# Patient Record
Sex: FEMALE | Race: BLACK OR AFRICAN AMERICAN | NOT HISPANIC OR LATINO | Employment: PART TIME | URBAN - METROPOLITAN AREA
[De-identification: names, ages, dates, MRNs, and addresses within clinical notes are randomized per-mention and may not be internally consistent; named-entity substitution may affect disease eponyms.]

---

## 2020-04-02 ENCOUNTER — HISTORICAL (OUTPATIENT)
Dept: ADMINISTRATIVE | Facility: HOSPITAL | Age: 30
End: 2020-04-02

## 2020-05-04 ENCOUNTER — HISTORICAL (OUTPATIENT)
Dept: ADMINISTRATIVE | Facility: HOSPITAL | Age: 30
End: 2020-05-04

## 2020-06-17 ENCOUNTER — HISTORICAL (OUTPATIENT)
Dept: ADMINISTRATIVE | Facility: HOSPITAL | Age: 30
End: 2020-06-17

## 2020-06-30 ENCOUNTER — HISTORICAL (OUTPATIENT)
Dept: ADMINISTRATIVE | Facility: HOSPITAL | Age: 30
End: 2020-06-30

## 2020-08-13 ENCOUNTER — HISTORICAL (OUTPATIENT)
Dept: ADMINISTRATIVE | Facility: HOSPITAL | Age: 30
End: 2020-08-13

## 2020-08-31 ENCOUNTER — HISTORICAL (OUTPATIENT)
Dept: ADMINISTRATIVE | Facility: HOSPITAL | Age: 30
End: 2020-08-31

## 2020-09-03 LAB — FINAL CULTURE: NORMAL

## 2021-08-12 ENCOUNTER — HISTORICAL (OUTPATIENT)
Dept: SURGERY | Facility: HOSPITAL | Age: 31
End: 2021-08-12

## 2022-01-02 ENCOUNTER — HISTORICAL (OUTPATIENT)
Dept: LAB | Facility: HOSPITAL | Age: 32
End: 2022-01-02

## 2022-03-28 LAB
PAP RECOMMENDATION EXT: NORMAL
PAP SMEAR: NORMAL

## 2022-04-09 ENCOUNTER — HISTORICAL (OUTPATIENT)
Dept: ADMINISTRATIVE | Facility: HOSPITAL | Age: 32
End: 2022-04-09
Payer: COMMERCIAL

## 2022-04-21 ENCOUNTER — HISTORICAL (OUTPATIENT)
Dept: ADMINISTRATIVE | Facility: HOSPITAL | Age: 32
End: 2022-04-21
Payer: COMMERCIAL

## 2022-04-21 LAB
B-HCG FREE SERPL-ACNC: NORMAL [IU]/L
PROGEST SERPL-MCNC: 10.4 NG/ML

## 2022-04-25 ENCOUNTER — HISTORICAL (OUTPATIENT)
Dept: ADMINISTRATIVE | Facility: HOSPITAL | Age: 32
End: 2022-04-25
Payer: COMMERCIAL

## 2022-04-25 VITALS
HEIGHT: 67 IN | DIASTOLIC BLOOD PRESSURE: 77 MMHG | SYSTOLIC BLOOD PRESSURE: 120 MMHG | OXYGEN SATURATION: 100 % | WEIGHT: 189.63 LBS | BODY MASS INDEX: 29.76 KG/M2

## 2022-04-25 LAB
B-HCG FREE SERPL-ACNC: 3870.25 [IU]/L
PROGEST SERPL-MCNC: 9.1 NG/ML

## 2022-04-28 ENCOUNTER — HISTORICAL (OUTPATIENT)
Dept: ADMINISTRATIVE | Facility: HOSPITAL | Age: 32
End: 2022-04-28
Payer: COMMERCIAL

## 2022-04-28 LAB
B-HCG FREE SERPL-ACNC: 1985.92 [IU]/L
PROGEST SERPL-MCNC: 5.4 NG/ML

## 2022-04-30 NOTE — OP NOTE
DATE OF SURGERY:    08/12/2021    SURGEON:  Quincy Garcia MD    PREOPERATIVE DIAGNOSIS:  Unacceptable cosmetic appearance of abdomen and flanks.    POSTOPERATIVE DIAGNOSIS:  Unacceptable cosmetic appearance of abdomen and flanks.    PROCEDURE:  Liposuction to abdomen and flanks, 750 cc lipo aspirate.    INDICATIONS FOR PROCEDURE:  Claude Bullock is a 30-year-old, who is unhappy with the way her abdomen appears.  She has excessive fat and skin.  She presents for liposuction.    ANESTHESIA:  General.    COMPLICATIONS:  None.    PROCEDURE IN DETAIL:  The patient was endotracheally intubated, prepped and draped in the usual sterile fashion.  Two stab incisions were made; 1 superiorly, 1 inferior to the umbilicus, 1 L tumescent solution was instilled throughout the abdomen and flanks.  We performed mechanically-assisted liposuction throughout, removing the excess fat, 750 cc of lipo aspirate.  The stab incisions were closed using fast-absorbing suture.  Sterile dressing was applied.  No complications.  I was scrubbed and present throughout the entire procedure.        ______________________________  MD EDWAR Lamas/MATA  DD:  08/12/2021  Time:  01:06PM  DT:  08/12/2021  Time:  04:55PM  Job #:  [id number]

## 2022-05-04 ENCOUNTER — LAB VISIT (OUTPATIENT)
Dept: LAB | Facility: HOSPITAL | Age: 32
End: 2022-05-04
Attending: OBSTETRICS & GYNECOLOGY
Payer: MEDICAID

## 2022-05-04 DIAGNOSIS — O46.93 THIRD TRIMESTER BLEEDING: Primary | ICD-10-CM

## 2022-05-04 LAB — B-HCG FREE SERPL-ACNC: 165.65 MIU/ML

## 2022-05-04 PROCEDURE — 36415 COLL VENOUS BLD VENIPUNCTURE: CPT

## 2022-05-04 PROCEDURE — 81025 URINE PREGNANCY TEST: CPT

## 2022-05-12 ENCOUNTER — APPOINTMENT (OUTPATIENT)
Dept: LAB | Facility: HOSPITAL | Age: 32
End: 2022-05-12
Attending: OBSTETRICS & GYNECOLOGY
Payer: MEDICAID

## 2022-05-12 DIAGNOSIS — O46.93 THIRD TRIMESTER BLEEDING: Primary | ICD-10-CM

## 2022-05-12 LAB — B-HCG FREE SERPL-ACNC: 22.16 MIU/ML

## 2022-05-12 PROCEDURE — 36415 COLL VENOUS BLD VENIPUNCTURE: CPT

## 2022-05-12 PROCEDURE — 81025 URINE PREGNANCY TEST: CPT

## 2022-05-17 ENCOUNTER — APPOINTMENT (OUTPATIENT)
Dept: LAB | Facility: HOSPITAL | Age: 32
End: 2022-05-17
Attending: OBSTETRICS & GYNECOLOGY
Payer: MEDICAID

## 2022-05-17 DIAGNOSIS — O02.1 MISSED ABORTION: Primary | ICD-10-CM

## 2022-05-17 LAB — B-HCG FREE SERPL-ACNC: 11.09 MIU/ML

## 2022-05-17 PROCEDURE — 36415 COLL VENOUS BLD VENIPUNCTURE: CPT

## 2022-05-17 PROCEDURE — 81025 URINE PREGNANCY TEST: CPT

## 2022-05-26 ENCOUNTER — APPOINTMENT (OUTPATIENT)
Dept: LAB | Facility: HOSPITAL | Age: 32
End: 2022-05-26
Attending: OBSTETRICS & GYNECOLOGY
Payer: MEDICAID

## 2022-05-26 DIAGNOSIS — O02.1 MISSED ABORTION: Primary | ICD-10-CM

## 2022-05-26 LAB — B-HCG FREE SERPL-ACNC: 3.49 MIU/ML

## 2022-05-26 PROCEDURE — 36415 COLL VENOUS BLD VENIPUNCTURE: CPT

## 2022-05-26 PROCEDURE — 81025 URINE PREGNANCY TEST: CPT

## 2022-06-02 ENCOUNTER — OFFICE VISIT (OUTPATIENT)
Dept: FAMILY MEDICINE | Facility: CLINIC | Age: 32
End: 2022-06-02
Payer: COMMERCIAL

## 2022-06-02 VITALS
WEIGHT: 197.63 LBS | HEART RATE: 78 BPM | BODY MASS INDEX: 31.02 KG/M2 | SYSTOLIC BLOOD PRESSURE: 93 MMHG | TEMPERATURE: 98 F | RESPIRATION RATE: 18 BRPM | OXYGEN SATURATION: 98 % | HEIGHT: 67 IN | DIASTOLIC BLOOD PRESSURE: 63 MMHG

## 2022-06-02 DIAGNOSIS — Z01.818 PRE-OP EVALUATION: ICD-10-CM

## 2022-06-02 PROBLEM — J30.9 ALLERGIC RHINITIS: Status: ACTIVE | Noted: 2022-01-26

## 2022-06-02 PROBLEM — L91.0 KELOID SCAR: Status: ACTIVE | Noted: 2022-06-02

## 2022-06-02 LAB
ALBUMIN SERPL-MCNC: 4.1 GM/DL (ref 3.5–5)
ALBUMIN/GLOB SERPL: 1.1 RATIO (ref 1.1–2)
ALP SERPL-CCNC: 62 UNIT/L (ref 40–150)
ALT SERPL-CCNC: 12 UNIT/L (ref 0–55)
APTT PPP: 32.6 SECONDS
AST SERPL-CCNC: 17 UNIT/L (ref 5–34)
BASOPHILS # BLD AUTO: 0.04 X10(3)/MCL (ref 0–0.2)
BASOPHILS NFR BLD AUTO: 1.1 %
BILIRUBIN DIRECT+TOT PNL SERPL-MCNC: 0.4 MG/DL
BUN SERPL-MCNC: 12.5 MG/DL (ref 7–18.7)
CALCIUM SERPL-MCNC: 9.7 MG/DL (ref 8.4–10.2)
CHLORIDE SERPL-SCNC: 103 MMOL/L (ref 98–107)
CO2 SERPL-SCNC: 27 MMOL/L (ref 22–29)
CREAT SERPL-MCNC: 0.71 MG/DL (ref 0.55–1.02)
EOSINOPHIL # BLD AUTO: 0.09 X10(3)/MCL (ref 0–0.9)
EOSINOPHIL NFR BLD AUTO: 2.4 %
ERYTHROCYTE [DISTWIDTH] IN BLOOD BY AUTOMATED COUNT: 12.4 % (ref 11.5–17)
GLOBULIN SER-MCNC: 3.9 GM/DL (ref 2.4–3.5)
GLUCOSE SERPL-MCNC: 79 MG/DL (ref 74–100)
HCT VFR BLD AUTO: 40 % (ref 37–47)
HGB BLD-MCNC: 12.9 GM/DL (ref 12–16)
IMM GRANULOCYTES # BLD AUTO: 0 X10(3)/MCL (ref 0–0.02)
IMM GRANULOCYTES NFR BLD AUTO: 0 % (ref 0–0.43)
LYMPHOCYTES # BLD AUTO: 1.27 X10(3)/MCL (ref 0.6–4.6)
LYMPHOCYTES NFR BLD AUTO: 34.2 %
MCH RBC QN AUTO: 29.3 PG (ref 27–31)
MCHC RBC AUTO-ENTMCNC: 32.3 MG/DL (ref 33–36)
MCV RBC AUTO: 90.9 FL (ref 80–94)
MONOCYTES # BLD AUTO: 0.34 X10(3)/MCL (ref 0.1–1.3)
MONOCYTES NFR BLD AUTO: 9.2 %
NEUTROPHILS # BLD AUTO: 2 X10(3)/MCL (ref 2.1–9.2)
NEUTROPHILS NFR BLD AUTO: 53.1 %
NRBC BLD AUTO-RTO: 0 %
PLATELET # BLD AUTO: 264 X10(3)/MCL (ref 130–400)
PMV BLD AUTO: 10.2 FL (ref 9.4–12.4)
POTASSIUM SERPL-SCNC: 4.1 MMOL/L (ref 3.5–5.1)
PROT SERPL-MCNC: 8 GM/DL (ref 6.4–8.3)
RBC # BLD AUTO: 4.4 X10(6)/MCL (ref 4.2–5.4)
SODIUM SERPL-SCNC: 137 MMOL/L (ref 136–145)
WBC # SPEC AUTO: 3.7 X10(3)/MCL (ref 4.5–11.5)

## 2022-06-02 PROCEDURE — 85730 THROMBOPLASTIN TIME PARTIAL: CPT | Performed by: FAMILY MEDICINE

## 2022-06-02 PROCEDURE — 36415 COLL VENOUS BLD VENIPUNCTURE: CPT | Performed by: FAMILY MEDICINE

## 2022-06-02 PROCEDURE — 99214 OFFICE O/P EST MOD 30 MIN: CPT | Mod: PBBFAC | Performed by: FAMILY MEDICINE

## 2022-06-02 PROCEDURE — 80053 COMPREHEN METABOLIC PANEL: CPT | Performed by: FAMILY MEDICINE

## 2022-06-02 PROCEDURE — 85610 PROTHROMBIN TIME: CPT | Performed by: FAMILY MEDICINE

## 2022-06-02 PROCEDURE — 85025 COMPLETE CBC W/AUTO DIFF WBC: CPT | Performed by: FAMILY MEDICINE

## 2022-06-02 RX ORDER — LINACLOTIDE 145 UG/1
145 CAPSULE, GELATIN COATED ORAL DAILY
COMMUNITY
Start: 2022-06-01

## 2022-06-02 RX ORDER — FLUTICASONE PROPIONATE 50 MCG
SPRAY, SUSPENSION (ML) NASAL
COMMUNITY
Start: 2022-01-26

## 2022-06-02 RX ORDER — LORATADINE 10 MG/1
TABLET ORAL
COMMUNITY
Start: 2022-01-26

## 2022-06-02 RX ORDER — NORETHINDRONE ACETATE AND ETHINYL ESTRADIOL 1MG-20(24)
1 KIT ORAL DAILY
COMMUNITY
Start: 2022-03-28 | End: 2023-05-04

## 2022-06-02 NOTE — PROGRESS NOTES
Subjective:       Patient ID: Claude Bullock is a 31 y.o. female.    Chief Complaint: Pre-op Exam    HPI     30 yo here for pre op eval prior to breast lift. Pt reports that she is in her usual state of health with no concerns. She denies any SOB or CP with exertion. She is able to walk city blocks and flights of stairs without any problems. She has not had any problems with anesthesia in the past.     PMH: none  PSH: keloid excision, liposcution  FH: brother- DM I, grandfather- DM, grandmother- hypothyroid  SH: denies alcohol, tobacco, or drugs  GYN: ; taking OCP; regular mensesAllergies: NKDA  Meds: OCP    Pap NIL 2022      Review of Systems   Constitutional: Negative for activity change and unexpected weight change.   HENT: Negative for hearing loss, rhinorrhea and trouble swallowing.    Eyes: Negative for discharge and visual disturbance.   Respiratory: Negative for chest tightness and wheezing.    Cardiovascular: Negative for chest pain and palpitations.   Gastrointestinal: Negative for blood in stool, constipation, diarrhea and vomiting.   Endocrine: Negative for polydipsia and polyuria.   Genitourinary: Negative for difficulty urinating, dysuria, hematuria and menstrual problem.   Musculoskeletal: Negative for arthralgias, joint swelling and neck pain.   Neurological: Negative for weakness and headaches.   Psychiatric/Behavioral: Negative for confusion and dysphoric mood.            Objective:      Vitals:    22 0843   BP: 93/63   Pulse: 78   Resp: 18   Temp: 98.4 °F (36.9 °C)       Physical Exam      General: pleasant, well developed, in no acute distress  Head: normocephalic, atraumatic  Skin: warm and dry  Heart: regular rate and rhythm, S1S2 normal, no murmurs, rubs, or gallops  Lungs: clear to auscultation bilaterally, no wheezes  Abdomen: bowel sounds present, soft, nontender  Extremities: no edema  Neurological: nonfocal, alert and oriented, cooperative with exam  Psych: judgement and  insight good, though process logical, goal directed        Assessment/Plan:  Pre-op evaluation  -     CBC Auto Differential; Future; Expected date: 06/02/2022  -     Protime-INR; Future; Expected date: 06/02/2022  -     APTT; Future; Expected date: 06/02/2022  -     Comprehensive Metabolic Panel; Future; Expected date: 06/02/2022    - will review labs and then fax documentation     Follow up in about 1 year (around 6/2/2023).

## 2022-10-13 ENCOUNTER — DOCUMENTATION ONLY (OUTPATIENT)
Dept: FAMILY MEDICINE | Facility: CLINIC | Age: 32
End: 2022-10-13
Payer: COMMERCIAL

## 2023-05-04 ENCOUNTER — OFFICE VISIT (OUTPATIENT)
Dept: FAMILY MEDICINE | Facility: CLINIC | Age: 33
End: 2023-05-04
Payer: COMMERCIAL

## 2023-05-04 VITALS
HEIGHT: 67 IN | WEIGHT: 206.81 LBS | HEART RATE: 78 BPM | BODY MASS INDEX: 32.46 KG/M2 | RESPIRATION RATE: 18 BRPM | OXYGEN SATURATION: 99 % | SYSTOLIC BLOOD PRESSURE: 95 MMHG | TEMPERATURE: 98 F | DIASTOLIC BLOOD PRESSURE: 63 MMHG

## 2023-05-04 DIAGNOSIS — Z02.0 SCHOOL PHYSICAL EXAM: Primary | ICD-10-CM

## 2023-05-04 LAB
HBV SURFACE AB SER-ACNC: 0.31 MIU/ML
HBV SURFACE AB SERPL IA-ACNC: NONREACTIVE M[IU]/ML

## 2023-05-04 PROCEDURE — 86787 VARICELLA-ZOSTER ANTIBODY: CPT | Mod: 90 | Performed by: FAMILY MEDICINE

## 2023-05-04 PROCEDURE — 99214 OFFICE O/P EST MOD 30 MIN: CPT | Mod: PBBFAC | Performed by: FAMILY MEDICINE

## 2023-05-04 PROCEDURE — 86706 HEP B SURFACE ANTIBODY: CPT | Performed by: FAMILY MEDICINE

## 2023-05-04 PROCEDURE — 86762 RUBELLA ANTIBODY: CPT | Mod: 90 | Performed by: FAMILY MEDICINE

## 2023-05-04 PROCEDURE — 86765 RUBEOLA ANTIBODY: CPT | Mod: 90 | Performed by: FAMILY MEDICINE

## 2023-05-04 PROCEDURE — 86735 MUMPS ANTIBODY: CPT | Mod: 90 | Performed by: FAMILY MEDICINE

## 2023-05-04 PROCEDURE — 36415 COLL VENOUS BLD VENIPUNCTURE: CPT | Performed by: FAMILY MEDICINE

## 2023-05-04 PROCEDURE — 86480 TB TEST CELL IMMUN MEASURE: CPT | Mod: 90 | Performed by: FAMILY MEDICINE

## 2023-05-04 NOTE — PROGRESS NOTES
Subjective:       Patient ID: Claude Bullock is a 32 y.o. female.    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     Stopped OCP due to long menstrual cycles with all options that she has tried (nuva ring, pills, patch). Does not want to try depo. Not interested in IUD or nexplanon currently due to wanting another child in the near future.      Pap NIL March 2022    Review of Systems  As per HPI         Objective:      Vitals:    05/04/23 1121   BP: 95/63   Pulse: 78   Resp: 18   Temp: 98.4 °F (36.9 °C)       Physical Exam    General: pleasant, well developed, in no acute distress  Head: normocephalic, atraumatic  Skin: warm and dry  Heart: regular rate and rhythm, S1S2 normal, no murmurs, rubs, or gallops  Lungs: clear to auscultation bilaterally, no wheezes  Abdomen: bowel sounds present, soft, nontender  Extremities: no edema  Neurological: nonfocal, alert and oriented, cooperative with exam  Psych: judgement and insight good, though process logical, goal directed    Assessment/Plan:  School physical exam  -     Rubella antibody, IgG; Future; Expected date: 05/04/2023  -     Rubeola antibody IgG; Future; Expected date: 05/04/2023  -     Hepatitis B Surface Antibody, Qual/Quant; Future; Expected date: 05/04/2023  -     Varicella zoster antibody, IgG; Future; Expected date: 05/04/2023  -     Quantiferon Gold TB; Future; Expected date: 05/04/2023  -     Miscellaneous Test, Sendout Mumps Antibody IGg; Future; Expected date: 05/04/2023  -     [place] GENERIC ORDERABLE Mumps Antibody IGg; Future; Expected date: 05/04/2023  -     Hepatitis B Surface Ab, Qualitative; Future; Expected date: 05/04/2023    - labs ordered  - forms completed     Follow up in about 1 year (around 5/4/2024).

## 2023-05-05 DIAGNOSIS — Z23 NEED FOR VIRAL IMMUNIZATION: Primary | ICD-10-CM

## 2023-05-05 LAB
MEV IGG SER IA-ACNC: 4
MEV IGG SER QL IA: POSITIVE
MUV IGG SER IA-ACNC: 1.4
MUV IGG SER QL IA: POSITIVE
RUBV IGG SERPL IA-ACNC: 1.4
RUBV IGG SERPL QL IA: POSITIVE
VZV IGG SER IA-ACNC: 3.1
VZV IGG SER QL IA: POSITIVE

## 2023-05-07 LAB
GAMMA INTERFERON BACKGROUND BLD IA-ACNC: 0.01 IU/ML
M TB IFN-G BLD-IMP: NEGATIVE
M TB IFN-G CD4+ BCKGRND COR BLD-ACNC: 0.01 IU/ML
M TB IFN-G CD4+CD8+ BCKGRND COR BLD-ACNC: 0.04 IU/ML
MITOGEN IGNF BCKGRD COR BLD-ACNC: 9.99 IU/ML

## 2023-05-09 ENCOUNTER — CLINICAL SUPPORT (OUTPATIENT)
Dept: FAMILY MEDICINE | Facility: CLINIC | Age: 33
End: 2023-05-09
Payer: COMMERCIAL

## 2023-05-09 DIAGNOSIS — Z02.0 SCHOOL PHYSICAL EXAM: Primary | ICD-10-CM

## 2023-05-09 DIAGNOSIS — Z23 VACCINE FOR VIRAL HEPATITIS: ICD-10-CM

## 2023-05-09 PROCEDURE — 90739 HEPB VACC 2/4 DOSE ADULT IM: CPT | Mod: PBBFAC

## 2023-05-09 PROCEDURE — 90471 IMMUNIZATION ADMIN: CPT | Mod: PBBFAC

## 2023-05-09 PROCEDURE — 99211 OFF/OP EST MAY X REQ PHY/QHP: CPT | Mod: PBBFAC

## 2023-05-09 RX ADMIN — HEPATITIS B VACCINE (RECOMBINANT) ADJUVANTED 0.5 ML: 20 INJECTION, SOLUTION INTRAMUSCULAR at 09:05

## 2023-06-12 ENCOUNTER — CLINICAL SUPPORT (OUTPATIENT)
Dept: FAMILY MEDICINE | Facility: CLINIC | Age: 33
End: 2023-06-12
Payer: COMMERCIAL

## 2023-06-12 DIAGNOSIS — Z23 IMMUNIZATION DUE: Primary | ICD-10-CM

## 2023-06-12 PROCEDURE — 99211 OFF/OP EST MAY X REQ PHY/QHP: CPT | Mod: PBBFAC

## 2023-06-12 PROCEDURE — 90471 IMMUNIZATION ADMIN: CPT | Mod: PBBFAC

## 2023-06-12 PROCEDURE — 90739 HEPB VACC 2/4 DOSE ADULT IM: CPT | Mod: PBBFAC

## 2023-06-12 RX ADMIN — HEPATITIS B VACCINE (RECOMBINANT) ADJUVANTED 0.5 ML: 20 INJECTION, SOLUTION INTRAMUSCULAR at 01:06

## 2023-07-19 ENCOUNTER — PATIENT MESSAGE (OUTPATIENT)
Dept: RESEARCH | Facility: HOSPITAL | Age: 33
End: 2023-07-19

## 2023-08-16 LAB
INR PPP: 1
PROTHROMBIN TIME: 12.9 SECONDS (ref 11.4–14)

## 2023-11-28 DIAGNOSIS — Z02.0 SCHOOL PHYSICAL EXAM: Primary | ICD-10-CM

## 2023-12-04 ENCOUNTER — LAB VISIT (OUTPATIENT)
Dept: LAB | Facility: HOSPITAL | Age: 33
End: 2023-12-04
Attending: FAMILY MEDICINE

## 2023-12-04 DIAGNOSIS — Z02.0 SCHOOL PHYSICAL EXAM: ICD-10-CM

## 2023-12-04 PROCEDURE — 86706 HEP B SURFACE ANTIBODY: CPT

## 2023-12-04 PROCEDURE — 36415 COLL VENOUS BLD VENIPUNCTURE: CPT

## 2023-12-05 LAB
HBV SURFACE AB SER QL IA: POSITIVE
HBV SURFACE AB SERPL IA-ACNC: 552 MIU/ML

## 2023-12-22 ENCOUNTER — OFFICE VISIT (OUTPATIENT)
Dept: FAMILY MEDICINE | Facility: CLINIC | Age: 33
End: 2023-12-22
Payer: COMMERCIAL

## 2023-12-22 VITALS
RESPIRATION RATE: 18 BRPM | TEMPERATURE: 99 F | DIASTOLIC BLOOD PRESSURE: 62 MMHG | BODY MASS INDEX: 33.9 KG/M2 | HEIGHT: 67 IN | OXYGEN SATURATION: 99 % | SYSTOLIC BLOOD PRESSURE: 98 MMHG | HEART RATE: 79 BPM | WEIGHT: 216 LBS

## 2023-12-22 DIAGNOSIS — F41.9 ANXIETY: Primary | ICD-10-CM

## 2023-12-22 PROCEDURE — 99213 OFFICE O/P EST LOW 20 MIN: CPT | Mod: PBBFAC | Performed by: FAMILY MEDICINE

## 2023-12-22 RX ORDER — BUSPIRONE HYDROCHLORIDE 10 MG/1
10 TABLET ORAL 3 TIMES DAILY
Qty: 90 TABLET | Refills: 6 | Status: SHIPPED | OUTPATIENT
Start: 2023-12-22 | End: 2024-12-21

## 2023-12-22 NOTE — PROGRESS NOTES
Subjective:       Patient ID: Claude Bullock is a 33 y.o. female.    Chief Complaint: Follow-up (Routine clinic visit)    HPI  32 yo for routine visit    Having symptoms of anxiety. Would like to start something for anxiety. Had symptoms in the past. Can feel her heart racing at times. + stressors. Some problems with sleep at times. Increased worrying. No recent panic attacks. She took buspar and zoloft in the past for anxiety. Zoloft made her sleepy. She thinks buspar worked ok. Denies any symptoms of depression.     Pap NIL April 2022    Review of Systems  As per HPI         Objective:      Vitals:    12/22/23 1109   BP: 98/62   Pulse: 79   Resp: 18   Temp: 99 °F (37.2 °C)       Physical Exam    General: pleasant, well developed, in no acute distress  Head: normocephalic, atraumatic  Skin: warm and dry  Heart: regular rate and rhythm, S1S2 normal, no murmurs, rubs, or gallops  Lungs: clear to auscultation bilaterally, no wheezes  Neurological: nonfocal, alert and oriented, cooperative with exam  Psych: judgement and insight good, though process logical, goal directed      Assessment/Plan:  Anxiety    Other orders  -     busPIRone (BUSPAR) 10 MG tablet; Take 1 tablet (10 mg total) by mouth 3 (three) times daily.  Dispense: 90 tablet; Refill: 6    - treatment options discussed  - will buspar and continue to monitor     Follow up in about 2 months (around 2/22/2024).

## 2024-04-11 ENCOUNTER — OFFICE VISIT (OUTPATIENT)
Dept: FAMILY MEDICINE | Facility: CLINIC | Age: 34
End: 2024-04-11
Payer: COMMERCIAL

## 2024-04-11 VITALS
BODY MASS INDEX: 33.71 KG/M2 | DIASTOLIC BLOOD PRESSURE: 63 MMHG | WEIGHT: 214.75 LBS | SYSTOLIC BLOOD PRESSURE: 96 MMHG | TEMPERATURE: 98 F | HEART RATE: 67 BPM | OXYGEN SATURATION: 100 % | RESPIRATION RATE: 18 BRPM | HEIGHT: 67 IN

## 2024-04-11 DIAGNOSIS — L91.0 KELOID: Primary | ICD-10-CM

## 2024-04-11 PROCEDURE — 96372 THER/PROPH/DIAG INJ SC/IM: CPT | Mod: PBBFAC

## 2024-04-11 PROCEDURE — 17110 DESTRUCTION B9 LES UP TO 14: CPT | Mod: PBBFAC | Performed by: FAMILY MEDICINE

## 2024-04-11 PROCEDURE — 99214 OFFICE O/P EST MOD 30 MIN: CPT | Mod: PBBFAC,25 | Performed by: FAMILY MEDICINE

## 2024-04-11 PROCEDURE — 11900 INJECT SKIN LESIONS </W 7: CPT | Mod: 59,PBBFAC | Performed by: FAMILY MEDICINE

## 2024-04-11 RX ADMIN — TRIAMCINOLONE ACETONIDE 10 MG: 10 INJECTION, SUSPENSION INTRA-ARTICULAR; INTRALESIONAL at 09:04

## 2024-04-11 NOTE — PROGRESS NOTES
Subjective:       Patient ID: Claude Bullock is a 33 y.o. female.    Chief Complaint: Follow-up, Anxiety, and Keloid    HPI  34 yo for concern of a keloid on her upper chest. Had some tape there that tore her skin. This has been about 2-3 months. She has had keloids on her ears (one injected with steroids and one excised). She also noticed 2 other keloids on her back that have not been treated     Review of Systems  As per HPI         Objective:      Vitals:    04/11/24 0838   BP: 96/63   Pulse: 67   Resp: 18   Temp: 98.4 °F (36.9 °C)       Physical Exam    Gen: alert, no acute distress  Skin: firm, hyperpigmented keloid to upper chest, mid back, and lower back  Psych: cooperative, appropriate mood and affect    Procedure Note:  Procedure: cryotherapy for keloid x 3  Performed by: Jaclyn Fisher MD  Consent: signed consent obtained after discussion of risks, benefits, and alternative treatments. Time out completed  Description: Liquid nitrogen used for cryotherapy. Tip held 1 cm from lesion and sprayed in freeze-thaw cycle.   The patient tolerated the procedure well with no complications.     Procedure Note:  Procedure: intralesional steroid injection x 3 for keloids  Performed by: Jaclyn Fisher MD  Consent: signed consent obtained after discussion of risks, benefits, and alternative treatments. Time out completed  Description: Area cleaned with alcohol. 10mg kenalog used for injection into the 3 keloids.   The patient tolerated the procedure well with no complications.       Assessment/Plan:  Keloid  - cryo and intralesional injections today  - Post care instructions and return precautions discussed.     Other orders    -     triamcinolone acetonide injection 10 mg         Follow up in about 4 weeks (around 5/9/2024).

## 2024-05-07 ENCOUNTER — OFFICE VISIT (OUTPATIENT)
Dept: FAMILY MEDICINE | Facility: CLINIC | Age: 34
End: 2024-05-07
Payer: COMMERCIAL

## 2024-05-07 VITALS
RESPIRATION RATE: 18 BRPM | DIASTOLIC BLOOD PRESSURE: 69 MMHG | TEMPERATURE: 99 F | BODY MASS INDEX: 33.43 KG/M2 | HEART RATE: 85 BPM | OXYGEN SATURATION: 99 % | WEIGHT: 213 LBS | HEIGHT: 67 IN | SYSTOLIC BLOOD PRESSURE: 106 MMHG

## 2024-05-07 DIAGNOSIS — Z02.0 SCHOOL PHYSICAL EXAM: ICD-10-CM

## 2024-05-07 DIAGNOSIS — L91.0 KELOID: Primary | ICD-10-CM

## 2024-05-07 PROCEDURE — 11900 INJECT SKIN LESIONS </W 7: CPT | Mod: PBBFAC

## 2024-05-07 PROCEDURE — 96372 THER/PROPH/DIAG INJ SC/IM: CPT | Mod: PBBFAC

## 2024-05-07 PROCEDURE — 17110 DESTRUCTION B9 LES UP TO 14: CPT | Mod: PBBFAC

## 2024-05-07 PROCEDURE — 36415 COLL VENOUS BLD VENIPUNCTURE: CPT

## 2024-05-07 PROCEDURE — 99213 OFFICE O/P EST LOW 20 MIN: CPT | Mod: PBBFAC,25

## 2024-05-07 PROCEDURE — 86480 TB TEST CELL IMMUN MEASURE: CPT

## 2024-05-07 RX ADMIN — TRIAMCINOLONE ACETONIDE 10 MG: 10 INJECTION, SUSPENSION INTRA-ARTICULAR; INTRALESIONAL at 01:05

## 2024-05-07 NOTE — PROGRESS NOTES
"[facility] OFFICE VISIT NOTE  Claude Bullock  [id number]  05/07/2024    Chief Complaint   Patient presents with    Keloid       Claude Bullock is a 33 y.o. female  presenting to [facility] Procedure clinic for 4 week follow up s/p cryotherapy and intralesional steroid injection x3 to keloids on upper chest, mid back, and low back on 4/11/24. Patient currently with 4 new keloids on her buttocks and anterior hip. Reports that she is happy with how previous keloids look, but would be interested in getting her upper back keloid injected again along with the new keloids.      Review of Systems   Constitutional:  Negative for chills and fever.   Respiratory:  Negative for shortness of breath.    Cardiovascular:  Negative for chest pain.   Gastrointestinal:  Negative for constipation, diarrhea, nausea and vomiting.         Blood pressure 106/69, pulse 85, temperature 98.6 °F (37 °C), resp. rate 18, height 5' 6.93" (1.7 m), weight 96.6 kg (213 lb), last menstrual period 03/26/2024, SpO2 99%.   Physical Exam  Gen darnell: NAD  Skin: firm hypopigmented keloid improved in size to anterior chest, upper back, and lower back, new firm keloid to left and right upper buttocks and left and right anterior hip      Current Medications:   Current Outpatient Medications   Medication Sig Dispense Refill    busPIRone (BUSPAR) 10 MG tablet Take 1 tablet (10 mg total) by mouth 3 (three) times daily. 90 tablet 6    fluticasone propionate (FLONASE) 50 mcg/actuation nasal spray fluticasone propionate Take 1 spray(s) (nasal) 1 time per day for 14 days 1 spray per nostril. 20220126 spray,suspension 1 time per day nasal 14 days active 50 mcg/actuation      LINZESS 145 mcg Cap capsule Take 145 mcg by mouth once daily.      loratadine (CLARITIN) 10 mg tablet Claritin Take 1 tablet (oral) 1 time per day for 14 days [id number] tablet 1 time per day oral 14 days active 10 mg       No current facility-administered medications for this visit.       Assessment:   1. " Keloid    2. School physical exam        Plan:  1. Keloid  - Patient will undergo cryotherapy and steroid injection to new keloids of left and right buttocks and left hip  - Patient would like another steroid injection to previous upper back keloid  - Will follow up in 4 weeks      Orders Placed This Encounter    Destruction, Benign Lesion    Quantiferon Gold TB    triamcinolone acetonide injection 10 mg     Destruction, Benign Lesion    Date/Time: 5/7/2024 12:30 PM    Performed by: Bean Alvarez MD  Authorized by: Bean Alvarez MD    Consent Done?:  Yes (Written)  Pre-Procedure:     Timeout: prior to procedure the correct patient, procedure, and site was verified      Local anesthesia used?: No    Indications:     other (Keloid)  Location:     Lower Extremity:  Buttock and hip    Detail:  Left hip    Detail:  Left buttock and right buttock  Procedure Details:     Cosmetic?: No      Number of lesions:  3    Destruction method:  Cryotherapy     Patient tolerated the procedure well with no immediate complications.    Procedure Note:  Procedure: intralesional steroid injection x 3 for keloids  Performed by: Bean Alvarez MD  Consent: signed consent obtained after discussion of risks, benefits, and alternative treatments. Time out completed  Description: Area cleaned with alcohol. 10mg kenalog used for injection into the 4 keloids.   The patient tolerated the procedure well with no complications.     Return to clinic in 4 weeks for keloid follow up, or sooner if needed.     Bean Alvarez MD  [facility] HO-1

## 2024-05-09 LAB
GAMMA INTERFERON BACKGROUND BLD IA-ACNC: 0.04 IU/ML
M TB IFN-G BLD-IMP: NEGATIVE
M TB IFN-G CD4+ BCKGRND COR BLD-ACNC: 0.01 IU/ML
M TB IFN-G CD4+CD8+ BCKGRND COR BLD-ACNC: -0.02 IU/ML
MITOGEN IGNF BCKGRD COR BLD-ACNC: 9.96 IU/ML

## 2024-06-25 ENCOUNTER — OFFICE VISIT (OUTPATIENT)
Dept: FAMILY MEDICINE | Facility: CLINIC | Age: 34
End: 2024-06-25
Payer: COMMERCIAL

## 2024-06-25 VITALS
RESPIRATION RATE: 18 BRPM | WEIGHT: 210 LBS | BODY MASS INDEX: 32.96 KG/M2 | TEMPERATURE: 99 F | OXYGEN SATURATION: 99 % | HEIGHT: 67 IN

## 2024-06-25 DIAGNOSIS — L91.0 KELOID: Primary | ICD-10-CM

## 2024-06-25 PROCEDURE — 17110 DESTRUCTION B9 LES UP TO 14: CPT | Mod: PBBFAC | Performed by: FAMILY MEDICINE

## 2024-06-25 PROCEDURE — 11900 INJECT SKIN LESIONS </W 7: CPT | Mod: PBBFAC | Performed by: FAMILY MEDICINE

## 2024-06-25 PROCEDURE — 96372 THER/PROPH/DIAG INJ SC/IM: CPT | Mod: PBBFAC

## 2024-06-25 PROCEDURE — 99213 OFFICE O/P EST LOW 20 MIN: CPT | Mod: PBBFAC

## 2024-06-25 RX ADMIN — TRIAMCINOLONE ACETONIDE 10 MG: 10 INJECTION, SUSPENSION INTRA-ARTICULAR; INTRALESIONAL at 01:06

## 2024-06-25 NOTE — PROGRESS NOTES
Subjective:       Patient ID: Claude Bullock is a 33 y.o. female.    Chief Complaint: Keloid    HPI  34 yo returns to minor surgery for keloid follow-up.  She tolerated the last cryotherapy and intralesional steroid injections well.  She reports improvement to the keloids on her back, bilateral posterior lower hips, and anterior hip.  She does note that the keloid on her upper chest seems to have enlarged again.    Review of Systems  As per HPI         Objective:      Vitals:    06/25/24 1313   BP: (P) 98/66   Pulse: (P) 76   Resp: 18   Temp: 98.6 °F (37 °C)       Physical Exam    Gen: alert, no acute distress  Skin:  keloid to upper chest, left anterior hip, mid back, and bilateral posterior hip/buttocks  Psych: cooperative, appropriate mood and affect    Procedure Note:  Procedure: cryotherapy to keloid x 1 on upper chest  Performed by: Jaclyn Fisher MD  Consent: signed consent obtained after discussion of risks, benefits, and alternative treatments. Time out completed  Description: Liquid nitrogen used for cryotherapy. Tip held 1 cm from lesion and sprayed in freeze-thaw cycle.   The patient tolerated the procedure well with no complications.     Procedure Note:  Procedure: intralesional steroid injection x 5 for keloids   Performed by: Jaclyn Fisher MD  Consent: signed consent obtained after discussion of risks, benefits, and alternative treatments. Time out completed  Description:  Area cleaned with alcohol. 10mg kenalog used for injection into the 5 keloids.   The patient tolerated the procedure well with no complications.       Assessment/Plan:  Keloid    Other orders  -     triamcinolone acetonide injection 10 mg    - cryo and intralesional injections today  - Post care instructions and return precautions discussed.      Follow up in about 4 weeks (around 7/23/2024).

## 2024-11-14 ENCOUNTER — OFFICE VISIT (OUTPATIENT)
Dept: FAMILY MEDICINE | Facility: CLINIC | Age: 34
End: 2024-11-14
Payer: COMMERCIAL

## 2024-11-14 VITALS
HEIGHT: 66 IN | OXYGEN SATURATION: 100 % | WEIGHT: 212.19 LBS | DIASTOLIC BLOOD PRESSURE: 80 MMHG | RESPIRATION RATE: 20 BRPM | TEMPERATURE: 98 F | SYSTOLIC BLOOD PRESSURE: 116 MMHG | HEART RATE: 76 BPM | BODY MASS INDEX: 34.1 KG/M2

## 2024-11-14 DIAGNOSIS — F41.9 ANXIETY: ICD-10-CM

## 2024-11-14 DIAGNOSIS — L91.0 KELOID: Primary | ICD-10-CM

## 2024-11-14 PROCEDURE — 17110 DESTRUCTION B9 LES UP TO 14: CPT | Mod: PBBFAC | Performed by: FAMILY MEDICINE

## 2024-11-14 PROCEDURE — 96372 THER/PROPH/DIAG INJ SC/IM: CPT | Mod: PBBFAC

## 2024-11-14 PROCEDURE — 99213 OFFICE O/P EST LOW 20 MIN: CPT | Mod: PBBFAC | Performed by: FAMILY MEDICINE

## 2024-11-14 PROCEDURE — 11900 INJECT SKIN LESIONS </W 7: CPT | Mod: PBBFAC | Performed by: FAMILY MEDICINE

## 2024-11-14 RX ADMIN — TRIAMCINOLONE ACETONIDE 5 MG: 10 INJECTION, SUSPENSION INTRA-ARTICULAR; INTRALESIONAL at 10:11

## 2024-11-14 NOTE — PROGRESS NOTES
Subjective:       Patient ID: Claude Bullock is a 34 y.o. female.    Chief Complaint: keloid    HPI    33 yo here for follow up    Keloid s/p cryo and injections. Keloid on chest improved but has increased in size again. Not as worried about the ones on her lower back (dark spots but they are flat)    Anxiety/stress- unable to take buspar TID. Pt reports dealing with a lot- work, school, child. Denies depression. Feeling unfocused, occasional racing heart with school, feeling overwhelmed. Sleeping well. Pt does not want to try a different medication at this time.     Pap NIL 2022    Review of Systems  As per HPI         Objective:      Vitals:    11/14/24 1009   BP: 116/80   Pulse: 76   Resp: 20   Temp: 98.2 °F (36.8 °C)       Physical Exam    Gen: alert, no acute distress  Skin:  Raised, hyperpigmented, oval keloid on center of chest  Psych: cooperative, appropriate mood and affect      Procedure Note:  Procedure: cryotherapy for keloid   Performed by: Jaclyn Fisher MD  Consent: signed consent obtained after discussion of risks, benefits, and alternative treatments. Time out completed  Description: Liquid nitrogen used for cryotherapy. Tip held 1 cm from lesion and sprayed in freeze-thaw cycle.   The patient tolerated the procedure well with no complications.      Procedure Note:  Procedure: intralesional steroid injection for keloid  Performed by: Jaclyn Fisher MD  Consent: signed consent obtained after discussion of risks, benefits, and alternative treatments. Time out completed  Description: Area cleaned with alcohol. 5mg kenalog used for injection into the keloid.   The patient tolerated the procedure well with no complications.        Assessment/Plan:  Keloid  -     triamcinolone acetonide injection 5 mg  - cryotherapy and intralesional Kenalog injection today.   - Post care instructions and return precautions discussed.     Anxiety  - treatment options discussed including different medication, counseling, and  behavioral techniques and modifications.  Patient elects to continue to monitor at this point.  Notify clinic for increased or continued symptoms     Follow up in about 4 weeks (around 12/12/2024).

## 2025-05-12 ENCOUNTER — LAB VISIT (OUTPATIENT)
Dept: LAB | Facility: HOSPITAL | Age: 35
End: 2025-05-12
Attending: OBSTETRICS & GYNECOLOGY
Payer: COMMERCIAL

## 2025-05-12 DIAGNOSIS — O09.291 PREVIOUS CHILD WITH ANOMALY, ANTEPARTUM, FIRST TRIMESTER: Primary | ICD-10-CM

## 2025-05-12 LAB
B-HCG FREE SERPL-ACNC: 12.37 MIU/ML
PROGEST SERPL-MCNC: <0.5 NG/ML

## 2025-05-12 PROCEDURE — 84702 CHORIONIC GONADOTROPIN TEST: CPT

## 2025-05-12 PROCEDURE — 84144 ASSAY OF PROGESTERONE: CPT

## 2025-05-12 PROCEDURE — 36415 COLL VENOUS BLD VENIPUNCTURE: CPT

## 2025-05-15 ENCOUNTER — OFFICE VISIT (OUTPATIENT)
Dept: FAMILY MEDICINE | Facility: CLINIC | Age: 35
End: 2025-05-15
Payer: COMMERCIAL

## 2025-05-15 VITALS
WEIGHT: 212.19 LBS | HEART RATE: 76 BPM | SYSTOLIC BLOOD PRESSURE: 113 MMHG | BODY MASS INDEX: 34.1 KG/M2 | OXYGEN SATURATION: 98 % | DIASTOLIC BLOOD PRESSURE: 75 MMHG | TEMPERATURE: 99 F | HEIGHT: 66 IN

## 2025-05-15 DIAGNOSIS — O02.81 INAPPROPRIATE LEVEL OF QUANTITATIVE HCG FOR GESTATIONAL AGE IN EARLY PREGNANCY: Primary | ICD-10-CM

## 2025-05-15 DIAGNOSIS — Z02.0 SCHOOL PHYSICAL EXAM: ICD-10-CM

## 2025-05-15 DIAGNOSIS — E66.9 OBESITY, UNSPECIFIED CLASS, UNSPECIFIED OBESITY TYPE, UNSPECIFIED WHETHER SERIOUS COMORBIDITY PRESENT: ICD-10-CM

## 2025-05-15 DIAGNOSIS — Z11.3 SCREENING EXAMINATION FOR STD (SEXUALLY TRANSMITTED DISEASE): ICD-10-CM

## 2025-05-15 DIAGNOSIS — Z00.00 WELLNESS EXAMINATION: ICD-10-CM

## 2025-05-15 DIAGNOSIS — Z12.4 SCREENING FOR CERVICAL CANCER: ICD-10-CM

## 2025-05-15 LAB
C TRACH DNA SPEC QL NAA+PROBE: NOT DETECTED
CLUE CELLS VAG QL WET PREP: NORMAL
N GONORRHOEA DNA SPEC QL NAA+PROBE: NOT DETECTED
SPECIMEN SOURCE: NORMAL
T VAGINALIS VAG QL WET PREP: NORMAL
WBC #/AREA VAG WET PREP: NORMAL
YEAST SPEC QL WET PREP: NORMAL

## 2025-05-15 PROCEDURE — 87210 SMEAR WET MOUNT SALINE/INK: CPT | Performed by: FAMILY MEDICINE

## 2025-05-15 PROCEDURE — 99213 OFFICE O/P EST LOW 20 MIN: CPT | Mod: PBBFAC | Performed by: FAMILY MEDICINE

## 2025-05-15 PROCEDURE — 87491 CHLMYD TRACH DNA AMP PROBE: CPT | Performed by: FAMILY MEDICINE

## 2025-05-15 PROCEDURE — 88174 CYTOPATH C/V AUTO IN FLUID: CPT | Performed by: FAMILY MEDICINE

## 2025-05-15 RX ORDER — SERTRALINE HYDROCHLORIDE 50 MG/1
50 TABLET, FILM COATED ORAL DAILY
Qty: 30 TABLET | Refills: 11 | Status: SHIPPED | OUTPATIENT
Start: 2025-05-15 | End: 2026-05-15

## 2025-05-15 NOTE — PROGRESS NOTES
Subjective:       Patient ID: Claude Bullock is a 34 y.o. female.    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 Requesting labs for TB screening and also physical    Anxiety- pt previously on buspar but no longer taking this medication due to having to take the medication multiple times per day and not noticing improvement. Denies symptoms of depression. Agreeable to starting a daily medication    Pt reports that she had a + home UPT and then started bleeding later that day. Had labs with OB and hcg 12. Pt did not have an ultrasound and reports that she was 1 week past her expected menstrual cycle. Still having some bleeding that she reports as close to her regular menstrual cycle.     Pap NIL 2022       Review of Systems  As per HPI         Objective:      Vitals:    05/15/25 0850   BP: 113/75   Pulse: 76   Temp: 98.7 °F (37.1 °C)       Physical Exam    General: pleasant, well developed, in no acute distress  Head: normocephalic, atraumatic  Skin: warm and dry  Heart: regular rate and rhythm, S1S2 normal, no murmurs, rubs, or gallops  Lungs: clear to auscultation bilaterally, no wheezes  Abdomen: bowel sounds present, soft, nontender  GYN: chaperone present in room; slight bleeding present; no discharge or lesions present  Extremities: no edema  Neurological: nonfocal, alert and oriented, cooperative with exam  Psych: judgement and insight good, though process logical, goal directed      Assessment/Plan:  Inappropriate level of quantitative hCG for gestational age in early pregnancy  -     HCG, Quantitative; Future; Expected date: 05/15/2025  - will trend hcg    School physical exam  -     Quantiferon Gold TB; Future; Expected date: 05/15/2025  - physical completed    Wellness examination  -     CBC Auto Differential; Future; Expected date: 05/15/2025  -     Comprehensive Metabolic Panel; Future;  Expected date: 05/15/2025  -     Hemoglobin A1C; Future; Expected date: 05/15/2025  -     Lipid Panel; Future; Expected date: 05/15/2025  -     TSH; Future; Expected date: 05/15/2025  -     Vitamin D; Future; Expected date: 05/15/2025    Screening examination for STD (sexually transmitted disease)  -     Chlamydia/GC, PCR  -     Wet Prep, Genital  -     Hepatitis C Antibody; Future; Expected date: 05/15/2025  -     HIV 1/2 Ag/Ab (4th Gen); Future; Expected date: 05/15/2025  -     SYPHILIS ANTIBODY (WITH REFLEX RPR); Future; Expected date: 05/15/2025    Screening for cervical cancer  -     Liquid-Based Pap Smear, Screening    Obesity, unspecified class, unspecified obesity type, unspecified whether serious comorbidity present  -     Hemoglobin A1C; Future; Expected date: 05/15/2025  -     Lipid Panel; Future; Expected date: 05/15/2025  -     TSH; Future; Expected date: 05/15/2025    Other orders  -     sertraline (ZOLOFT) 50 MG tablet; Take 1 tablet (50 mg total) by mouth once daily.  Dispense: 30 tablet; Refill: 11    Will start zoloft and continue to monitor     Follow up in about 6 weeks (around 6/26/2025).

## 2025-05-19 ENCOUNTER — LAB VISIT (OUTPATIENT)
Dept: LAB | Facility: HOSPITAL | Age: 35
End: 2025-05-19
Attending: FAMILY MEDICINE
Payer: COMMERCIAL

## 2025-05-19 DIAGNOSIS — Z00.00 WELLNESS EXAMINATION: ICD-10-CM

## 2025-05-19 DIAGNOSIS — Z02.0 SCHOOL PHYSICAL EXAM: ICD-10-CM

## 2025-05-19 DIAGNOSIS — O02.81 INAPPROPRIATE LEVEL OF QUANTITATIVE HCG FOR GESTATIONAL AGE IN EARLY PREGNANCY: ICD-10-CM

## 2025-05-19 DIAGNOSIS — E66.9 OBESITY, UNSPECIFIED CLASS, UNSPECIFIED OBESITY TYPE, UNSPECIFIED WHETHER SERIOUS COMORBIDITY PRESENT: ICD-10-CM

## 2025-05-19 DIAGNOSIS — Z11.3 SCREENING EXAMINATION FOR STD (SEXUALLY TRANSMITTED DISEASE): ICD-10-CM

## 2025-05-19 LAB
25(OH)D3+25(OH)D2 SERPL-MCNC: 27 NG/ML (ref 30–80)
ALBUMIN SERPL-MCNC: 3.8 G/DL (ref 3.5–5)
ALBUMIN/GLOB SERPL: 0.9 RATIO (ref 1.1–2)
ALP SERPL-CCNC: 66 UNIT/L (ref 40–150)
ALT SERPL-CCNC: 10 UNIT/L (ref 0–55)
ANION GAP SERPL CALC-SCNC: 7 MEQ/L
AST SERPL-CCNC: 12 UNIT/L (ref 11–45)
B-HCG FREE SERPL-ACNC: <2.42 MIU/ML
BASOPHILS # BLD AUTO: 0.04 X10(3)/MCL
BASOPHILS NFR BLD AUTO: 1.1 %
BILIRUB SERPL-MCNC: 0.2 MG/DL
BUN SERPL-MCNC: 19.1 MG/DL (ref 7–18.7)
CALCIUM SERPL-MCNC: 9.3 MG/DL (ref 8.4–10.2)
CHLORIDE SERPL-SCNC: 106 MMOL/L (ref 98–107)
CHOLEST SERPL-MCNC: 157 MG/DL
CHOLEST/HDLC SERPL: 3 {RATIO} (ref 0–5)
CO2 SERPL-SCNC: 27 MMOL/L (ref 22–29)
CREAT SERPL-MCNC: 0.74 MG/DL (ref 0.55–1.02)
CREAT/UREA NIT SERPL: 26
EOSINOPHIL # BLD AUTO: 0.15 X10(3)/MCL (ref 0–0.9)
EOSINOPHIL NFR BLD AUTO: 4 %
ERYTHROCYTE [DISTWIDTH] IN BLOOD BY AUTOMATED COUNT: 12.7 % (ref 11.5–17)
EST. AVERAGE GLUCOSE BLD GHB EST-MCNC: 111.2 MG/DL
GFR SERPLBLD CREATININE-BSD FMLA CKD-EPI: >60 ML/MIN/1.73/M2
GLOBULIN SER-MCNC: 4.1 GM/DL (ref 2.4–3.5)
GLUCOSE SERPL-MCNC: 92 MG/DL (ref 74–100)
HBA1C MFR BLD: 5.5 %
HCT VFR BLD AUTO: 37.7 % (ref 37–47)
HCV AB SERPL QL IA: NONREACTIVE
HDLC SERPL-MCNC: 57 MG/DL (ref 35–60)
HGB BLD-MCNC: 12.1 G/DL (ref 12–16)
HIV 1+2 AB+HIV1 P24 AG SERPL QL IA: NONREACTIVE
IMM GRANULOCYTES # BLD AUTO: 0.01 X10(3)/MCL (ref 0–0.04)
IMM GRANULOCYTES NFR BLD AUTO: 0.3 %
LDLC SERPL CALC-MCNC: 91 MG/DL (ref 50–140)
LYMPHOCYTES # BLD AUTO: 1.51 X10(3)/MCL (ref 0.6–4.6)
LYMPHOCYTES NFR BLD AUTO: 40.2 %
MCH RBC QN AUTO: 28.8 PG (ref 27–31)
MCHC RBC AUTO-ENTMCNC: 32.1 G/DL (ref 33–36)
MCV RBC AUTO: 89.8 FL (ref 80–94)
MONOCYTES # BLD AUTO: 0.34 X10(3)/MCL (ref 0.1–1.3)
MONOCYTES NFR BLD AUTO: 9 %
NEUTROPHILS # BLD AUTO: 1.71 X10(3)/MCL (ref 2.1–9.2)
NEUTROPHILS NFR BLD AUTO: 45.4 %
NRBC BLD AUTO-RTO: 0 %
PLATELET # BLD AUTO: 229 X10(3)/MCL (ref 130–400)
PMV BLD AUTO: 9.2 FL (ref 7.4–10.4)
POTASSIUM SERPL-SCNC: 4.5 MMOL/L (ref 3.5–5.1)
PROT SERPL-MCNC: 7.9 GM/DL (ref 6.4–8.3)
RBC # BLD AUTO: 4.2 X10(6)/MCL (ref 4.2–5.4)
RPR SER QL: REACTIVE
RPR SER-TITR: ABNORMAL {TITER}
SODIUM SERPL-SCNC: 140 MMOL/L (ref 136–145)
T PALLIDUM AB SER QL: REACTIVE
TRIGL SERPL-MCNC: 47 MG/DL (ref 37–140)
TSH SERPL-ACNC: 1 UIU/ML (ref 0.35–4.94)
VLDLC SERPL CALC-MCNC: 9 MG/DL
WBC # BLD AUTO: 3.76 X10(3)/MCL (ref 4.5–11.5)

## 2025-05-19 PROCEDURE — 84702 CHORIONIC GONADOTROPIN TEST: CPT

## 2025-05-19 PROCEDURE — 36415 COLL VENOUS BLD VENIPUNCTURE: CPT

## 2025-05-19 PROCEDURE — 84443 ASSAY THYROID STIM HORMONE: CPT

## 2025-05-19 PROCEDURE — 80053 COMPREHEN METABOLIC PANEL: CPT

## 2025-05-19 PROCEDURE — 86480 TB TEST CELL IMMUN MEASURE: CPT

## 2025-05-19 PROCEDURE — 80061 LIPID PANEL: CPT

## 2025-05-19 PROCEDURE — 86803 HEPATITIS C AB TEST: CPT

## 2025-05-19 PROCEDURE — 87389 HIV-1 AG W/HIV-1&-2 AB AG IA: CPT

## 2025-05-19 PROCEDURE — 85025 COMPLETE CBC W/AUTO DIFF WBC: CPT

## 2025-05-19 PROCEDURE — 86592 SYPHILIS TEST NON-TREP QUAL: CPT

## 2025-05-19 PROCEDURE — 83036 HEMOGLOBIN GLYCOSYLATED A1C: CPT

## 2025-05-19 PROCEDURE — 82306 VITAMIN D 25 HYDROXY: CPT

## 2025-05-19 PROCEDURE — 86780 TREPONEMA PALLIDUM: CPT

## 2025-05-21 LAB
GAMMA INTERFERON BACKGROUND BLD IA-ACNC: 0.02 IU/ML
M TB IFN-G BLD-IMP: NEGATIVE
M TB IFN-G CD4+ BCKGRND COR BLD-ACNC: 0.04 IU/ML
M TB IFN-G CD4+CD8+ BCKGRND COR BLD-ACNC: 0.03 IU/ML
MITOGEN IGNF BCKGRD COR BLD-ACNC: >10 IU/ML

## 2025-06-20 NOTE — PROGRESS NOTES
Labs reviewed with the patient. Syphilis antibody positive and rpr reactive at 1:2. Pt states that she received PCN injections in the past and reports that her titers were 1:2 at that time. Due to ongoing 1:2 possible serofast state, will treat with bicillin x 3. Pt aware of the plan.

## 2025-06-24 ENCOUNTER — TELEPHONE (OUTPATIENT)
Dept: FAMILY MEDICINE | Facility: CLINIC | Age: 35
End: 2025-06-24
Payer: COMMERCIAL

## 2025-06-30 ENCOUNTER — CLINICAL SUPPORT (OUTPATIENT)
Dept: FAMILY MEDICINE | Facility: CLINIC | Age: 35
End: 2025-06-30
Payer: COMMERCIAL

## 2025-06-30 VITALS
RESPIRATION RATE: 18 BRPM | WEIGHT: 207.81 LBS | HEART RATE: 73 BPM | TEMPERATURE: 98 F | DIASTOLIC BLOOD PRESSURE: 70 MMHG | OXYGEN SATURATION: 99 % | HEIGHT: 65 IN | SYSTOLIC BLOOD PRESSURE: 104 MMHG | BODY MASS INDEX: 34.62 KG/M2

## 2025-06-30 DIAGNOSIS — Z76.89 ENCOUNTER FOR MEDICATION ADMINISTRATION: Primary | ICD-10-CM

## 2025-06-30 PROCEDURE — 99213 OFFICE O/P EST LOW 20 MIN: CPT | Mod: PBBFAC

## 2025-06-30 PROCEDURE — 96372 THER/PROPH/DIAG INJ SC/IM: CPT | Mod: PBBFAC

## 2025-06-30 RX ADMIN — PENICILLIN G BENZATHINE 2.4 MILLION UNITS: 1200000 INJECTION, SUSPENSION INTRAMUSCULAR at 02:06

## 2025-07-10 ENCOUNTER — CLINICAL SUPPORT (OUTPATIENT)
Dept: FAMILY MEDICINE | Facility: CLINIC | Age: 35
End: 2025-07-10
Payer: COMMERCIAL

## 2025-07-10 VITALS
TEMPERATURE: 98 F | HEART RATE: 79 BPM | SYSTOLIC BLOOD PRESSURE: 114 MMHG | DIASTOLIC BLOOD PRESSURE: 75 MMHG | OXYGEN SATURATION: 100 %

## 2025-07-10 DIAGNOSIS — Z76.89 ENCOUNTER FOR MEDICATION ADMINISTRATION: Primary | ICD-10-CM

## 2025-07-10 PROCEDURE — 96372 THER/PROPH/DIAG INJ SC/IM: CPT | Mod: PBBFAC

## 2025-07-10 PROCEDURE — 99212 OFFICE O/P EST SF 10 MIN: CPT | Mod: PBBFAC

## 2025-07-10 RX ADMIN — PENICILLIN G BENZATHINE 2.4 MILLION UNITS: 2400000 INJECTION, SUSPENSION INTRAMUSCULAR at 03:07

## 2025-07-10 NOTE — PROGRESS NOTES
Administer bicillin 2,400,000 units IM, bilateral buttocks. Patient tolerated well, no adverse reactions. MD aware.